# Patient Record
Sex: FEMALE | Race: WHITE | NOT HISPANIC OR LATINO | Employment: STUDENT | URBAN - METROPOLITAN AREA
[De-identification: names, ages, dates, MRNs, and addresses within clinical notes are randomized per-mention and may not be internally consistent; named-entity substitution may affect disease eponyms.]

---

## 2018-01-27 ENCOUNTER — OFFICE VISIT (OUTPATIENT)
Dept: URGENT CARE | Facility: URGENT CARE | Age: 23
End: 2018-01-27
Payer: COMMERCIAL

## 2018-01-27 VITALS
OXYGEN SATURATION: 100 % | HEIGHT: 68 IN | DIASTOLIC BLOOD PRESSURE: 68 MMHG | BODY MASS INDEX: 26.52 KG/M2 | HEART RATE: 63 BPM | WEIGHT: 175 LBS | TEMPERATURE: 98.8 F | SYSTOLIC BLOOD PRESSURE: 125 MMHG

## 2018-01-27 DIAGNOSIS — M54.50 ACUTE BILATERAL LOW BACK PAIN WITHOUT SCIATICA: Primary | ICD-10-CM

## 2018-01-27 PROCEDURE — 99203 OFFICE O/P NEW LOW 30 MIN: CPT | Mod: 25 | Performed by: FAMILY MEDICINE

## 2018-01-27 PROCEDURE — 96372 THER/PROPH/DIAG INJ SC/IM: CPT | Performed by: FAMILY MEDICINE

## 2018-01-27 RX ORDER — KETOROLAC TROMETHAMINE 30 MG/ML
60 INJECTION, SOLUTION INTRAMUSCULAR; INTRAVENOUS ONCE
Qty: 2 ML | Refills: 0 | OUTPATIENT
Start: 2018-01-27 | End: 2018-01-27

## 2018-01-27 RX ORDER — HYDROCODONE BITARTRATE AND ACETAMINOPHEN 5; 325 MG/1; MG/1
1-2 TABLET ORAL EVERY 8 HOURS PRN
Qty: 18 TABLET | Refills: 0 | Status: SHIPPED | OUTPATIENT
Start: 2018-01-27 | End: 2023-05-18

## 2018-01-27 RX ORDER — CYCLOBENZAPRINE HCL 10 MG
5-10 TABLET ORAL 3 TIMES DAILY PRN
Qty: 30 TABLET | Refills: 0 | Status: SHIPPED | OUTPATIENT
Start: 2018-01-27 | End: 2023-05-18

## 2018-01-27 NOTE — MR AVS SNAPSHOT
After Visit Summary   1/27/2018    Dinorah Hart    MRN: [id number]           Patient Information     Date Of Birth          1995        Visit Information        Provider Department      1/27/2018 6:30 PM Lesa Howard MD [facility]        Today's Diagnoses     Acute bilateral low back pain without sciatica    -  1      Care Instructions      Back Pain (Acute or Chronic)    Back pain is one of the most common problems. The good news is that most people feel better in 1 to 2 weeks, and most of the rest in 1 to 2 months. Most people can remain active.  People experience and describe pain differently; not everyone is the same.    The pain can be sharp, stabbing, shooting, aching, cramping or burning.    Movement, standing, bending, lifting, sitting, or walking may worsen pain.    It can be localized to one spot or area, or it can be more generalized.    It can spread or radiate upwards, to the front, or go down your arms or legs (sciatica).    It can cause muscle spasm.  Most of the time, mechanical problems with the muscles or spine cause the pain. Mechanical problems are usually caused by an injury to the muscles or ligaments. While illness can cause back pain, it is usually not caused by a serious illness. Mechanical problems include:     Physical activity such as sports, exercise, work, or normal activity    Overexertion, lifting, pushing, pulling incorrectly or too aggressively    Sudden twisting, bending, or stretching from an accident, or accidental movement    Poor posture    Stretching or moving wrong, without noticing pain at the time    Poor coordination, lack of regular exercise (check with your doctor about this)    Spinal disc disease or arthritis    Stress  Pain can also be related to pregnancy, or illness like appendicitis, bladder or kidney infections, pelvic infections, and many other things.  Acute back pain usually gets better in 1 to 2  weeks. Back pain related to disk disease, arthritis in the spinal joints or spinal stenosis (narrowing of the spinal canal) can become chronic and last for months or years.  Unless you had a physical injury (for example, a car accident or fall) X-rays are usually not needed for the initial evaluation of back pain. If pain continues and does not respond to medical treatment, X-rays and other tests may be needed.  Home care  Try these home care recommendations:    When in bed, try to find a position of comfort. A firm mattress is best. Try lying flat on your back with pillows under your knees. You can also try lying on your side with your knees bent up towards your chest and a pillow between your knees.    At first, do not try to stretch out the sore spots. If there is a strain, it is not like the good soreness you get after exercising without an injury. In this case, stretching may make it worse.    Avoid prolong sitting, long car rides, or travel. This puts more stress on the lower back than standing or walking.    During the first 24 to 72 hours after an acute injury or flare up of chronic back pain, apply an ice pack to the painful area for 20 minutes and then remove it for 20 minutes. Do this over a period of 60 to 90 minutes or several times a day. This will reduce swelling and pain. Wrap the ice pack in a thin towel or plastic to protect your skin.    You can start with ice, then switch to heat. Heat (hot shower, hot bath, or heating pad) reduces pain and works well for muscle spasms. Heat can be applied to the painful area for 20 minutes then remove it for 20 minutes. Do this over a period of 60 to 90 minutes or several times a day. Do not sleep on a heating pad. It can lead to skin burns or tissue damage.    You can alternate ice and heat therapy. Talk with your doctor about the best treatment for your back pain.    Therapeutic massage can help relax the back muscles without stretching them.    Be aware of  safe lifting methods and do not lift anything without stretching first.  Medicines  Talk to your doctor before using medicine, especially if you have other medical problems or are taking other medicines.    You may use over-the-counter medicine as directed on the bottle to control pain, unless another pain medicine was prescribed. If you have chronic conditions like diabetes, liver or kidney disease, stomach ulcers, or gastrointestinal bleeding, or are taking blood thinners, talk to your doctor before taking any medicine.    Be careful if you are given a prescription medicines, narcotics, or medicine for muscle spasms. They can cause drowsiness, affect your coordination, reflexes, and judgement. Do not drive or operate heavy machinery.  Follow-up care  Follow up with your healthcare provider, or as advised.   A radiologist will review any X-rays that were taken. Your provide will notify you of any new findings that may affect your care.  Call 911  Call emergency services if any of the following occur:    Trouble breathing    Confusion    Very drowsy or trouble awakening    Fainting or loss of consciousness    Rapid or very slow heart rate    Loss of bowel or bladder control  When to seek medical advice  Call your healthcare provider right away if any of these occur:     Pain becomes worse or spreads to your legs    Weakness or numbness in one or both legs    Numbness in the groin or genital area  Date Last Reviewed: 7/1/2016 2000-2017 [employer]. [address] [place] [address], [place] [address]. All rights reserved. This information is not intended as a substitute for professional medical care. Always follow your healthcare professional's instructions.                Follow-ups after your visit        Follow-up notes from your care team     Return if symptoms worsen or fail to improve.      Who to contact     If you have questions or need follow up information about today's clinic visit or your schedule  "please contact [facility] directly at [phone].  Normal or non-critical lab and imaging results will be communicated to you by MyChart, letter or phone within 4 business days after the clinic has received the results. If you do not hear from us within 7 days, please contact the clinic through MyChart or phone. If you have a critical or abnormal lab result, we will notify you by phone as soon as possible.  Submit refill requests through [employer] or call your pharmacy and they will forward the refill request to us. Please allow 3 business days for your refill to be completed.          Additional Information About Your Visit        [employer] Information     [employer] lets you send messages to your doctor, view your test results, renew your prescriptions, schedule appointments and more. To sign up, go to [email or web address] . Click on \"Log in\" on the left side of the screen, which will take you to the Welcome page. Then click on \"Sign up Now\" on the right side of the page.     You will be asked to enter the access code listed below, as well as some personal information. Please follow the directions to create your username and password.     Your access code is: [email or web address]  Expires: 2018  7:11 PM     Your access code will  in 90 days. If you need help or a new code, please call your [facility] clinic or [phone].        Care EveryWhere ID     This is your Care EveryWhere ID. This could be used by other organizations to access your [facility] medical records  [id number]        Your Vitals Were     Pulse Temperature Height Last Period Pulse Oximetry Breastfeeding?    63 98.8  F (37.1  C) (Tympanic) 5' 8\" (1.727 m) 2018 100% No    BMI (Body Mass Index)                   26.61 kg/m2            Blood Pressure from Last 3 Encounters:   18 125/68    Weight from Last 3 Encounters:   18 175 lb (79.4 kg)              We Performed the Following     INJECTION INTRAMUSCULAR " OR SUB-Q     KETOROLAC TROMETHAMINE 15MG          Today's Medication Changes          These changes are accurate as of 1/27/18  7:12 PM.  If you have any questions, ask your nurse or doctor.               Start taking these medicines.        Dose/Directions    cyclobenzaprine 10 MG tablet   Commonly known as:  FLEXERIL   Used for:  Acute bilateral low back pain without sciatica   Started by:  Lesa Howard MD        Dose:  5-10 mg   Take 0.5-1 tablets (5-10 mg) by mouth 3 times daily as needed for muscle spasms   Quantity:  30 tablet   Refills:  0       HYDROcodone-acetaminophen 5-325 MG per tablet   Commonly known as:  NORCO   Used for:  Acute bilateral low back pain without sciatica   Started by:  Lesa Howard MD        Dose:  1-2 tablet   Take 1-2 tablets by mouth every 8 hours as needed for moderate to severe pain maximum 6 tablet(s) per day   Quantity:  18 tablet   Refills:  0       ketorolac 60 MG/2ML Soln injection   Commonly known as:  TORADOL   Used for:  Acute bilateral low back pain without sciatica   Started by:  Lesa Howard MD        Dose:  60 mg   Inject 2 mLs (60 mg) into the muscle once for 1 dose   Quantity:  2 mL   Refills:  0            Where to get your medicines      Some of these will need a paper prescription and others can be bought over the counter.  Ask your nurse if you have questions.     Bring a paper prescription for each of these medications     cyclobenzaprine 10 MG tablet    HYDROcodone-acetaminophen 5-325 MG per tablet       You don't need a prescription for these medications     ketorolac 60 MG/2ML Soln injection                Primary Care Provider    Provider Outside       No address on file        Equal Access to Services     [facility]: Hadii kalyan Solitario, warosemaryda luqrobert, qaybta kaолег thomas. [facility] [phone].    [employer]EN[employer]ÓN: meche Soni  disposición servicios gratuitos de asistencia lingüística. Krissy carr [phone].    We comply with applicable federal civil rights laws and [place] laws. We do not discriminate on the basis of race, color, national origin, age, disability, sex, sexual orientation, or gender identity.            Thank you!     Thank you for choosing [facility]  for your care. Our goal is always to provide you with excellent care. Hearing back from our patients is one way we can continue to improve our services. Please take a few minutes to complete the written survey that you may receive in the mail after your visit with us. Thank you!             Your Updated Medication List - Protect others around you: Learn how to safely use, store and throw away your medicines at www.disposemymeds.org.          This list is accurate as of 1/27/18  7:12 PM.  Always use your most recent med list.                   Brand Name Dispense Instructions for use Diagnosis    cyclobenzaprine 10 MG tablet    FLEXERIL    30 tablet    Take 0.5-1 tablets (5-10 mg) by mouth 3 times daily as needed for muscle spasms    Acute bilateral low back pain without sciatica       HYDROcodone-acetaminophen 5-325 MG per tablet    NORCO    18 tablet    Take 1-2 tablets by mouth every 8 hours as needed for moderate to severe pain maximum 6 tablet(s) per day    Acute bilateral low back pain without sciatica       ketorolac 60 MG/2ML Soln injection    TORADOL    2 mL    Inject 2 mLs (60 mg) into the muscle once for 1 dose    Acute bilateral low back pain without sciatica

## 2018-01-28 NOTE — PATIENT INSTRUCTIONS

## 2018-01-28 NOTE — PROGRESS NOTES
"SUBJECTIVE:   Dinorah Hart is a 22 year old female who complains of an injury causing low back pain 1 day ago. The pain is positional with bending or lifting, with radiation down the left leg today while driving home for 2 hours. Mechanism of injury: lifted heavy sled with ice augers with her dad last night.  About 3 hours later, she stood up and noticed extreme low back pain. Symptoms have been worsening since that time. Prior history of back problems: no prior back problems. There was numbness in the left foot while driving home as well but not now.      OBJECTIVE: /68 (BP Location: Left arm, Patient Position: Semi-Knight's, Cuff Size: Adult Regular)  Pulse 63  Temp 98.8  F (37.1  C) (Tympanic)  Ht 5' 8\" (1.727 m)  Wt 175 lb (79.4 kg)  LMP 01/13/2018  SpO2 100%  Breastfeeding? No  BMI 26.61 kg/m2   Patient appears to be in moderate pain, antalgic gait noted. Lumbosacral spine area reveals no local tenderness or mass. There is bilateral L-S paraspinal muscle tenderness and spasm.  Painful and reduced LS ROM noted. Straight leg raise is negative bilaterally. DTR' including heel and toe gait is normal.      Lumbar spine X-Ray: not indicated.     ASSESSMENT:   Acute low back pain without sciatica    PLAN:  Toradol given as per orders.   For acute pain, rest, intermittent application of cold packs (later, may switch to heat, but do not sleep on heating pad), analgesics and muscle relaxants are recommended. Discussed longer term treatment plan of prn NSAID's and discussed a home back care exercise program with flexion exercise routine. Vicodin as per orders. Proper lifting with avoidance of heavy lifting discussed. Consider Physical Therapy and XRay studies if not improving. Call or return to clinic prn if these symptoms worsen or fail to improve as anticipated.  Lesa Allen MD    "

## 2018-01-28 NOTE — NURSING NOTE
"Chief Complaint   Patient presents with     Urgent Care     Back Pain     pt stated that it started last night and level of pain was 9/10       Initial /68 (BP Location: Left arm, Patient Position: Semi-Knight's, Cuff Size: Adult Regular)  Pulse 63  Temp 98.8  F (37.1  C) (Tympanic)  Ht 5' 8\" (1.727 m)  Wt 175 lb (79.4 kg)  LMP 01/13/2018  SpO2 100%  Breastfeeding? No  BMI 26.61 kg/m2 Estimated body mass index is 26.61 kg/(m^2) as calculated from the following:    Height as of this encounter: 5' 8\" (1.727 m).    Weight as of this encounter: 175 lb (79.4 kg).  Medication Reconciliation: complete   SMA Adrián    "

## 2023-05-06 ENCOUNTER — HEALTH MAINTENANCE LETTER (OUTPATIENT)
Age: 28
End: 2023-05-06

## 2023-05-18 ENCOUNTER — OFFICE VISIT (OUTPATIENT)
Dept: FAMILY MEDICINE | Facility: CLINIC | Age: 28
End: 2023-05-18
Payer: COMMERCIAL

## 2023-05-18 VITALS
WEIGHT: 204.9 LBS | HEART RATE: 64 BPM | BODY MASS INDEX: 32.16 KG/M2 | TEMPERATURE: 98.2 F | SYSTOLIC BLOOD PRESSURE: 118 MMHG | HEIGHT: 67 IN | DIASTOLIC BLOOD PRESSURE: 86 MMHG | OXYGEN SATURATION: 98 %

## 2023-05-18 DIAGNOSIS — Z00.00 ROUTINE HISTORY AND PHYSICAL EXAMINATION OF ADULT: Primary | ICD-10-CM

## 2023-05-18 DIAGNOSIS — Z12.4 SCREENING FOR CERVICAL CANCER: ICD-10-CM

## 2023-05-18 DIAGNOSIS — Z11.3 ROUTINE SCREENING FOR STI (SEXUALLY TRANSMITTED INFECTION): ICD-10-CM

## 2023-05-18 DIAGNOSIS — Z30.432 ENCOUNTER FOR IUD REMOVAL: ICD-10-CM

## 2023-05-18 LAB — T VAGINALIS DNA SPEC QL NAA+PROBE: NOT DETECTED

## 2023-05-18 PROCEDURE — 87661 TRICHOMONAS VAGINALIS AMPLIF: CPT | Mod: ORL

## 2023-05-18 PROCEDURE — 87591 N.GONORRHOEAE DNA AMP PROB: CPT | Mod: ORL

## 2023-05-18 PROCEDURE — 87491 CHLMYD TRACH DNA AMP PROBE: CPT | Mod: ORL

## 2023-05-18 PROCEDURE — G0145 SCR C/V CYTO,THINLAYER,RESCR: HCPCS | Mod: ORL

## 2023-05-18 PROCEDURE — 80061 LIPID PANEL: CPT | Mod: ORL

## 2023-05-18 ASSESSMENT — ENCOUNTER SYMPTOMS
PSYCHIATRIC NEGATIVE: 1
GASTROINTESTINAL NEGATIVE: 1
MUSCULOSKELETAL NEGATIVE: 1
EYES NEGATIVE: 1
ALLERGIC/IMMUNOLOGIC NEGATIVE: 1
NEUROLOGICAL NEGATIVE: 1
CARDIOVASCULAR NEGATIVE: 1
RESPIRATORY NEGATIVE: 1
HEMATOLOGIC/LYMPHATIC NEGATIVE: 1
ENDOCRINE NEGATIVE: 1
CONSTITUTIONAL NEGATIVE: 1

## 2023-05-18 ASSESSMENT — PATIENT HEALTH QUESTIONNAIRE - PHQ9
SUM OF ALL RESPONSES TO PHQ QUESTIONS 1-9: 3
5. POOR APPETITE OR OVEREATING: NOT AT ALL

## 2023-05-18 ASSESSMENT — ANXIETY QUESTIONNAIRES
GAD7 TOTAL SCORE: 0
5. BEING SO RESTLESS THAT IT IS HARD TO SIT STILL: NOT AT ALL
2. NOT BEING ABLE TO STOP OR CONTROL WORRYING: NOT AT ALL
IF YOU CHECKED OFF ANY PROBLEMS ON THIS QUESTIONNAIRE, HOW DIFFICULT HAVE THESE PROBLEMS MADE IT FOR YOU TO DO YOUR WORK, TAKE CARE OF THINGS AT HOME, OR GET ALONG WITH OTHER PEOPLE: NOT DIFFICULT AT ALL
GAD7 TOTAL SCORE: 0
1. FEELING NERVOUS, ANXIOUS, OR ON EDGE: NOT AT ALL
3. WORRYING TOO MUCH ABOUT DIFFERENT THINGS: NOT AT ALL
6. BECOMING EASILY ANNOYED OR IRRITABLE: NOT AT ALL
7. FEELING AFRAID AS IF SOMETHING AWFUL MIGHT HAPPEN: NOT AT ALL

## 2023-05-18 NOTE — NURSING NOTE
"ROOM:1  MELIDA MARTINO    Preferred Name: Dinorah     How did you hear about us?  Other - Referred by friend    28 year old  Chief Complaint   Patient presents with     Physical     With pap       Blood pressure 118/86, pulse 64, temperature 98.2  F (36.8  C), temperature source Oral, height 1.712 m (5' 7.4\"), weight 92.9 kg (204 lb 14.4 oz), SpO2 98 %, not currently breastfeeding. Body mass index is 31.71 kg/m .  BP completed using cuff size:        There is no problem list on file for this patient.      Wt Readings from Last 2 Encounters:   05/18/23 92.9 kg (204 lb 14.4 oz)   01/27/18 79.4 kg (175 lb)     BP Readings from Last 3 Encounters:   05/18/23 118/86   01/27/18 125/68       Allergies   Allergen Reactions     Penapar-Vk [Penicillin V]        Current Outpatient Medications   Medication     cyclobenzaprine (FLEXERIL) 10 MG tablet     HYDROcodone-acetaminophen (NORCO) 5-325 MG per tablet     No current facility-administered medications for this visit.       Social History     Tobacco Use     Smoking status: Never     Smokeless tobacco: Never   Vaping Use     Vaping status: Never Used   Substance Use Topics     Alcohol use: Yes     Drug use: Never       Honoring Choices - Health Care Directive Guide offered to patient at time of visit.    Health Maintenance Due   Topic Date Due     YEARLY PREVENTIVE VISIT  Never done     ADVANCE CARE PLANNING  Never done     HIV SCREENING  Never done     HEPATITIS C SCREENING  Never done     PAP  Never done     DTAP/TDAP/TD IMMUNIZATION (7 - Td or Tdap) 05/07/2019       Immunization History   Administered Date(s) Administered     COVID-19 Bivalent 18+ (Moderna) 12/11/2022     COVID-19 Monovalent 18+ (Moderna) 01/29/2021, 02/26/2021       No results found for: PAP    No lab results found.         View : No data to display.                    5/18/2023     7:44 AM   PHQ-9 SCORE   PHQ-9 Total Score 3           5/18/2023     7:44 AM   ARTURO-7 SCORE   Total Score 0            View : No " data to display.                Tess Kirk    May 18, 2023 7:49 AM

## 2023-05-18 NOTE — PROGRESS NOTES
ANNUAL WELLNESS EXAM     Today's Date: May 18, 2023     Patient Dinorah Hart 1995 presents to the clinic today for a preventative health visit. She is otherwise healthy with no significant PMH and on no chronic medications. She works as a  (grades K-5).         SUBJECTIVE     History of Present Illness:  Currently has kyleena IUD. Inserted May 2018. Considering another IUD vs nexplanon.      Allergies   Allergen Reactions     Penapar-Vk [Penicillin V]       No current outpatient medications    Past Medical History:   Diagnosis Date     No known health problems         Family History   Problem Relation Age of Onset     Diabetes Maternal Grandmother      Heart Failure Maternal Grandfather      Mental Illness Paternal Grandfather      Heart Failure Paternal Grandfather         Do you have a first-degree relative with a history of the following:  A. Cancer of the breast or ovaries - No   B. Heart attack, heart pain, or stroke before the age of 55 - No  C. Unexplained death from drowning or car accident - No  D. Osteoporosis or any other significant bone health concerns - No    Social History     Tobacco Use     Smoking status: Never     Smokeless tobacco: Never   Vaping Use     Vaping status: Never Used   Substance Use Topics     Alcohol use: Yes     Drug use: Never        History   Sexual Activity     Sexual activity: Not Currently     Birth control/ protection: I.U.D.             5/18/2023     7:44 AM   PHQ   PHQ-9 Total Score 3   Q9: Thoughts of better off dead/self-harm past 2 weeks Not at all        Immunization History   Administered Date(s) Administered     COVID-19 Bivalent 18+ (Moderna) 12/11/2022     COVID-19 Monovalent 18+ (Moderna) 01/29/2021, 02/26/2021        Health Maintenance Due   Topic Date Due     YEARLY PREVENTIVE VISIT  Never done     ADVANCE CARE PLANNING  Never done     HIV SCREENING  Never done     HEPATITIS C SCREENING  Never done     PAP  Never done      "DTAP/TDAP/TD IMMUNIZATION (7 - Td or Tdap) 05/07/2019      Health Maintenance components reviewed - Seasonal Influenza vaccine status is up to date & Covid-19 vaccine status is up to date.    Diet: in general, a \"healthy\" diet      Exercise: Runs multiple times a week    LMP 5/11/2023    Review of Systems   Constitutional: Negative.    HENT: Negative.    Eyes: Negative.    Respiratory: Negative.    Cardiovascular: Negative.    Gastrointestinal: Negative.    Endocrine: Negative.    Breasts:  negative.    Genitourinary: Negative.    Musculoskeletal: Negative.    Allergic/Immunologic: Negative.    Neurological: Negative.    Hematological: Negative.    Psychiatric/Behavioral: Negative.                OBJECTIVE     /86   Pulse 64   Temp 98.2  F (36.8  C) (Oral)   Ht 1.712 m (5' 7.4\")   Wt 92.9 kg (204 lb 14.4 oz)   SpO2 98%   BMI 31.71 kg/m         Labs:  No results found for: WBC, HGB, HCT, PLT, CHOL, TRIG, HDL, LDLDIRECT, ALT, AST, NA, CREATININE, BUN, CO2, TSH, PSA, INR, GLUF, HGBA1C, MICROALBUR    Physical Exam  Vitals reviewed.   Constitutional:       General: She is not in acute distress.     Appearance: Normal appearance. She is well-developed. She is not ill-appearing.   HENT:      Head: Normocephalic and atraumatic.      Right Ear: Tympanic membrane and external ear normal.      Left Ear: Tympanic membrane and external ear normal.      Nose: Nose normal. No rhinorrhea.      Mouth/Throat:      Mouth: Mucous membranes are moist.      Pharynx: No oropharyngeal exudate.   Eyes:      General:         Right eye: No discharge.         Left eye: No discharge.      Extraocular Movements: Extraocular movements intact.      Conjunctiva/sclera: Conjunctivae normal.   Neck:      Thyroid: No thyromegaly.      Trachea: No tracheal deviation.   Cardiovascular:      Rate and Rhythm: Normal rate and regular rhythm.      Pulses: Normal pulses.      Heart sounds: Normal heart sounds, S1 normal and S2 normal. No murmur " heard.     No friction rub. No S3 or S4 sounds.   Pulmonary:      Effort: Pulmonary effort is normal. No respiratory distress.      Breath sounds: Normal breath sounds. No wheezing or rales.   Chest:   Breasts:     Right: No inverted nipple, mass, nipple discharge or skin change.      Left: No inverted nipple, mass, nipple discharge or skin change.   Abdominal:      General: Bowel sounds are normal.      Palpations: Abdomen is soft. There is no mass.   Genitourinary:     Labia:         Right: No rash.         Left: No rash.       Vagina: Normal.      Cervix: Normal.      Comments: Mild spotting from cervical os. IUD strings easily visualized.  Musculoskeletal:         General: Normal range of motion.      Cervical back: Normal range of motion and neck supple.      Right lower leg: No edema.      Left lower leg: No edema.   Lymphadenopathy:      Cervical: No cervical adenopathy.      Upper Body:      Right upper body: No supraclavicular or axillary adenopathy.      Left upper body: No supraclavicular or axillary adenopathy.   Skin:     General: Skin is warm and dry.      Capillary Refill: Capillary refill takes less than 2 seconds.      Findings: No rash.   Neurological:      General: No focal deficit present.      Mental Status: She is alert and oriented to person, place, and time.      Motor: No abnormal muscle tone.      Deep Tendon Reflexes: Reflexes are normal and symmetric.   Psychiatric:         Mood and Affect: Mood normal.         Behavior: Behavior normal.         Thought Content: Thought content normal.         Judgment: Judgment normal.             ASSESSMENT/PLAN     (Z00.00) Routine history and physical examination of adult  (primary encounter diagnosis)  Plan: Lipid panel  -Discussed/Reinforced healthy diety, lifestyle, exercise and safety.  -Recommended completion of routine dental and eye exam.  -Lab screenings completed today. Results pending.    (Z12.4) Screening for cervical cancer  Comment: last  PAP 5 years ago. Normal.  Plan: GYNECOLOGIC CYTOLOGY (PAP SMEAR LAB ORDER)    (Z11.3) Routine screening for STI (sexually transmitted infection)  Plan: Chlamydia trachomatis/Neisseria gonorrhoeae by         PCR - Clinic Collect, Trichomonas vaginalis DNA        PCR    (Z30.948) Encounter for IUD removal  IUD removal discussed with patient. Risks discussed including pregnancy, inability to visualize strings, partial removal. Patient understands, is comfortable with proceeding.    Cervix visualized with speculum, strings visualized from os. Strings grasped with sterile ring forceps. Gentle traction applied with removal of IUD. IUD inspected and is complete and intact. Minimal bleeding noted. Patient tolerated procedure well.     Discussed pelvic rest x 1 week, return of menses and fertility. Patient will return tomorrow for nexplanon insertion.     Follow-Up:  Scheduled for visit 5/19 for nexplanon insertion.    Patient engaged in their plan of care. Patient verbalized understanding and agreed with the final plan.  AVS printed and given to patient.    Lin Armendariz NP   [facility]  [facility]  [address] [place] [id number]  [phone]

## 2023-05-19 ENCOUNTER — OFFICE VISIT (OUTPATIENT)
Dept: FAMILY MEDICINE | Facility: CLINIC | Age: 28
End: 2023-05-19
Payer: COMMERCIAL

## 2023-05-19 VITALS
DIASTOLIC BLOOD PRESSURE: 83 MMHG | OXYGEN SATURATION: 100 % | BODY MASS INDEX: 32.02 KG/M2 | TEMPERATURE: 97.9 F | SYSTOLIC BLOOD PRESSURE: 122 MMHG | HEIGHT: 67 IN | RESPIRATION RATE: 18 BRPM | HEART RATE: 59 BPM | WEIGHT: 204 LBS

## 2023-05-19 DIAGNOSIS — Z30.017 NEXPLANON INSERTION: Primary | ICD-10-CM

## 2023-05-19 LAB
C TRACH DNA SPEC QL PROBE+SIG AMP: NEGATIVE
CHOLEST SERPL-MCNC: 198 MG/DL
HDLC SERPL-MCNC: 49 MG/DL
LDLC SERPL CALC-MCNC: 141 MG/DL
N GONORRHOEA DNA SPEC QL NAA+PROBE: NEGATIVE
NONHDLC SERPL-MCNC: 149 MG/DL
TRIGL SERPL-MCNC: 41 MG/DL

## 2023-05-19 ASSESSMENT — PAIN SCALES - GENERAL: PAINLEVEL: NO PAIN (0)

## 2023-05-19 NOTE — PROGRESS NOTES
"  Isa Copeland is a 28 year old patient here today for nexplanon (insertion). She desires nexplanon placement.     She had a kyleena IUD that was removed yesterday for contraception. She denies unprotected sexual intercourse in the past two weeks or since her last menstrual period. LMP 5/11/2023.  Menstrual cycles are regular.     Risks and benefits of nexplanon long term contraception were discussed including:  - Pregnancy rate of <0.4%  - Irregular bleeding/spotting in ~15% of users, especially in first three months  - Amenorrhea in ~20% of users  - Complications in <1% related to insertion including infection, scar, local nerve irritation, bruising    She understands risks and benefits and is comfortable proceeding. All questions answered. Consent form signed.    General Physical Exam:  Vitals: /83 (BP Location: Left arm, Patient Position: Sitting, Cuff Size: Adult Regular)   Pulse 59   Temp 97.9  F (36.6  C) (Oral)   Resp 18   Ht 1.702 m (5' 7\")   Wt 92.5 kg (204 lb)   SpO2 100%   BMI 31.95 kg/m    General: alert, no acute distress    Left arm was cleansed with alcohol swab after marking insertion site overlying the triceps in upper arm per  guidelines. 3cc of 1% Lidocaine was used to anesthetize insertion site. Arm was cleansed with betadine, appropriate sterile technique used. Nexplanon terry visible in needle cap prior to insertion.  Needle of device inserted at 30 degree angle until skin punctured, then needle was placed horizontal to skin and tented under skin until fully inserted.  Purple slider retracted fully back, then removed.  Implant was palpated by both provider and patient. Skin cleansed. Adhesive bandage placed over insertion site, then pressure dressing was applied.  User card completed and given to patient, chart label to be scanned into chart.  Pt instructed to remove pressure dressing after 24h, leave adhesive bandage in place and keep area clean/dry for three days. " Alternate contraception to be used for 7 days.     Nexplanon placement added to problem list with date of removal three years from 5/19/2023.     (Z30.017) Nexplanon insertion  (primary encounter diagnosis)  Plan: etonogestrel (NEXPLANON) subdermal implant 68         Mg    Lin Armendariz, NP

## 2023-05-19 NOTE — NURSING NOTE
"ROOM:1  MELIDA MARTINO    Preferred Name: Dinorah     How did you hear about us?  Current Patient    28 year old  Chief Complaint   Patient presents with     nexplanon     insertion       Blood pressure 122/83, pulse 59, temperature 97.9  F (36.6  C), temperature source Oral, resp. rate 18, height 1.702 m (5' 7\"), weight 92.5 kg (204 lb), SpO2 100 %, not currently breastfeeding. Body mass index is 31.95 kg/m .  BP completed using cuff size:        There is no problem list on file for this patient.      Wt Readings from Last 2 Encounters:   05/19/23 92.5 kg (204 lb)   05/18/23 92.9 kg (204 lb 14.4 oz)     BP Readings from Last 3 Encounters:   05/19/23 122/83   05/18/23 118/86   01/27/18 125/68       Allergies   Allergen Reactions     Penapar-Vk [Penicillin V]        No current outpatient medications on file.     No current facility-administered medications for this visit.       Social History     Tobacco Use     Smoking status: Never     Smokeless tobacco: Never   Vaping Use     Vaping status: Never Used   Substance Use Topics     Alcohol use: Yes     Drug use: Never       Honoring Choices - Health Care Directive Guide offered to patient at time of visit.    Health Maintenance Due   Topic Date Due     ADVANCE CARE PLANNING  Never done     HIV SCREENING  Never done     HEPATITIS C SCREENING  Never done     PAP  Never done     DTAP/TDAP/TD IMMUNIZATION (7 - Td or Tdap) 05/07/2019       Immunization History   Administered Date(s) Administered     COVID-19 Bivalent 18+ (Moderna) 12/11/2022     COVID-19 Monovalent 18+ (Moderna) 01/29/2021, 02/26/2021       No results found for: PAP    Recent Labs   Lab Test 05/18/23  0849   *   HDL 49*   TRIG 41           5/19/2023     7:56 AM   PHQ-2 ( 1999 Pfizer)   Q1: Little interest or pleasure in doing things 0   Q2: Feeling down, depressed or hopeless 0   PHQ-2 Score 0           5/18/2023     7:44 AM   PHQ-9 SCORE   PHQ-9 Total Score 3           5/18/2023     7:44 AM   ARTURO-7 " SCORE   Total Score 0            View : No data to display.                Lenora Mandujano, EMT    May 19, 2023 7:58 AM

## 2023-05-23 LAB
BKR LAB AP GYN ADEQUACY: NORMAL
BKR LAB AP GYN INTERPRETATION: NORMAL
BKR LAB AP HPV REFLEX: NORMAL
BKR LAB AP LMP: NORMAL
BKR LAB AP PREVIOUS ABNORMAL: NORMAL
PATH REPORT.COMMENTS IMP SPEC: NORMAL
PATH REPORT.COMMENTS IMP SPEC: NORMAL
PATH REPORT.RELEVANT HX SPEC: NORMAL

## 2024-02-26 NOTE — PROGRESS NOTES
"SUBJECTIVE:    Dinorah is a 27 y/o female who presents today for evaluation of abnormal uterine bleeding.  Nexplanon was inserted 5/2023; prior to this she had a Kyleena IUD. Periods lengthened from 4-5 days with the IUD to 5-8 days with Nexplanon; they continued to be monthly until December. From December until present has had constant mod-heavy spotting to a \"full blown period\". Cramping off and on. Going through 3-4 tampons/day. Denies pain or any abnormal discharge. Not currently sexually active, last partner was in July and has had negative STI testing since that time. Denies known history of fibroids or polyps. Not on anticoagulation. Denies weight changes, high stress, changes in diet/ exercise, hirsutism, significant acne changes, temperature dysregulation and nipple discharge. Does endorse feelings of fatigue.    Last pap smear: 5/2023 NIL     Allergies   Allergen Reactions    Penapar-Vk [Penicillin V]      No current outpatient medications on file.    PAST MEDICAL HISTORY:   Past Medical History:   Diagnosis Date    No known health problems      PAST SURGICAL HISTORY: No past surgical history on file.    FAMILY HISTORY:   Family History   Problem Relation Age of Onset    Diabetes Maternal Grandmother     Heart Failure Maternal Grandfather     Mental Illness Paternal Grandfather     Heart Failure Paternal Grandfather      SOCIAL HISTORY:   Social History     Tobacco Use    Smoking status: Never    Smokeless tobacco: Never   Substance Use Topics    Alcohol use: Yes     OBJECTIVE:  /86   Pulse 72   Ht 1.702 m (5' 7\")   Wt 100.2 kg (220 lb 14.4 oz)   BMI 34.60 kg/m    Pelvic Exam:  Vulva: No external lesions, normal hair distribution, no adenopathy  Vagina: Moist, pink, scant blood noted, well rugated, no lesions  Cervix: Smooth, pink, no visible lesions. Negative for CMT.  Uterus: Normal size, anteverted, non-tender, mobile  Ovaries: No mass, non-tender, mobile    ASSESSMENT/PLAN:    1. Excessive " bleeding in premenopausal period  2. Encounter for surveillance of Nexplanon subdermal contraceptive  Dinorah is a 28 yr old female, , who has a nexplanon in place; she experienced a change in her menstrual pattern 7 months after placement, now having 2 months of prolonged bleeding. Physical exam not significant for cervical structural reasons for AUB. TVUS ordered to rule out uterine structural causes. Has had neg STD testing; not sexually active. CBC ordered for prolonged heavy bleeding and fatigue. Will rule out thyroid dysfunction. Discussed option of adding a KAMRYN for bleeding control, declined at this time but is interested in having Nexplanon removed and IUD inserted if no significant findings in work-up. Past hstory of significant pain with IUD insertions.  Will follow up after to discuss findings/removal of nexplanon after US.   - TSH with free T4 reflex; Future  - CBC with Platelets; Future  - US Transvaginal Non OB; Future    I, Ashley Johnson, completed the PFSH and ROS. I then acted as a scribe for THOR Gupta, for the remainder of the visit.  Ashley Johnson BSN, RN, NORMANP Student    I agree with the PFSH and ROS as completed by the THOR Student, except for changes made by me.  The remainder of the encounter was performed by me and scribed by the WHNP Student.  The scribed note accurately reflects my personal services and decisions made by me.  Teresa Hunt, PEGGY, APRN, THOR

## 2024-02-27 ENCOUNTER — OFFICE VISIT (OUTPATIENT)
Dept: OBGYN | Facility: CLINIC | Age: 29
End: 2024-02-27
Attending: NURSE PRACTITIONER
Payer: COMMERCIAL

## 2024-02-27 VITALS
WEIGHT: 220.9 LBS | HEIGHT: 67 IN | DIASTOLIC BLOOD PRESSURE: 86 MMHG | SYSTOLIC BLOOD PRESSURE: 137 MMHG | BODY MASS INDEX: 34.67 KG/M2 | HEART RATE: 72 BPM

## 2024-02-27 DIAGNOSIS — N92.4 EXCESSIVE BLEEDING IN PREMENOPAUSAL PERIOD: ICD-10-CM

## 2024-02-27 DIAGNOSIS — Z30.46 ENCOUNTER FOR SURVEILLANCE OF NEXPLANON SUBDERMAL CONTRACEPTIVE: Primary | ICD-10-CM

## 2024-02-27 PROCEDURE — 99204 OFFICE O/P NEW MOD 45 MIN: CPT | Performed by: NURSE PRACTITIONER

## 2024-02-27 PROCEDURE — 99213 OFFICE O/P EST LOW 20 MIN: CPT | Performed by: NURSE PRACTITIONER

## 2024-02-27 NOTE — PATIENT INSTRUCTIONS
Thank you for trusting us with your care!     If you need to contact us for questions about:  Symptoms, Scheduling & Medical Questions; Non-urgent (2-3 day response) Avery message, Urgent (needing response today) [phone] (if after 3:30pm next day response)   Prescriptions: Please call your Pharmacy   Billing: [facility] [phone] or [facility]:[phone]

## 2024-02-27 NOTE — LETTER
"2/27/2024       RE: Dinorah Hart  [address]  [place] [address]     Dear Colleague,    Thank you for referring your patient, Dinorah Hart, to the [facility] [place] at [facility]. Please see a copy of my visit note below.    SUBJECTIVE:    Dinorah is a 27 y/o female who presents today for evaluation of abnormal uterine bleeding.  Nexplanon was inserted 5/2023; prior to this she had a Kyleena IUD. Periods lengthened from 4-5 days with the IUD to 5-8 days with Nexplanon; they continued to be monthly until December. From December until present has had constant mod-heavy spotting to a \"full blown period\". Cramping off and on. Going through 3-4 tampons/day. Denies pain or any abnormal discharge. Not currently sexually active, last partner was in July and has had negative STI testing since that time. Denies known history of fibroids or polyps. Not on anticoagulation. Denies weight changes, high stress, changes in diet/ exercise, hirsutism, significant acne changes, temperature dysregulation and nipple discharge. Does endorse feelings of fatigue.    Last pap smear: 5/2023 NIL     Allergies   Allergen Reactions    Penapar-Vk [Penicillin V]      No current outpatient medications on file.    PAST MEDICAL HISTORY:   Past Medical History:   Diagnosis Date    No known health problems      PAST SURGICAL HISTORY: No past surgical history on file.    FAMILY HISTORY:   Family History   Problem Relation Age of Onset    Diabetes Maternal Grandmother     Heart Failure Maternal Grandfather     Mental Illness Paternal Grandfather     Heart Failure Paternal Grandfather      SOCIAL HISTORY:   Social History     Tobacco Use    Smoking status: Never    Smokeless tobacco: Never   Substance Use Topics    Alcohol use: Yes     OBJECTIVE:  /86   Pulse 72   Ht 1.702 m (5' 7\")   Wt 100.2 kg (220 lb 14.4 oz)   BMI 34.60 kg/m    Pelvic Exam:  Vulva: No " external lesions, normal hair distribution, no adenopathy  Vagina: Moist, pink, scant blood noted, well rugated, no lesions  Cervix: Smooth, pink, no visible lesions. Negative for CMT.  Uterus: Normal size, anteverted, non-tender, mobile  Ovaries: No mass, non-tender, mobile    ASSESSMENT/PLAN:    1. Excessive bleeding in premenopausal period  2. Encounter for surveillance of Nexplanon subdermal contraceptive  Dinorah is a 28 yr old female, , who has a nexplanon in place; she experienced a change in her menstrual pattern 7 months after placement, now having 2 months of prolonged bleeding. Physical exam not significant for cervical structural reasons for AUB. TVUS ordered to rule out uterine structural causes. Has had neg STD testing; not sexually active. CBC ordered for prolonged heavy bleeding and fatigue. Will rule out thyroid dysfunction. Discussed option of adding a KAMRYN for bleeding control, declined at this time but is interested in having Nexplanon removed and IUD inserted if no significant findings in work-up. Past hstory of significant pain with IUD insertions.  Will follow up after to discuss findings/removal of nexplanon after US.   - TSH with free T4 reflex; Future  - CBC with Platelets; Future  - US Transvaginal Non OB; Future    I, Ashley Johnson, completed the PFSH and ROS. I then acted as a scribe for THOR Gupta, for the remainder of the visit.  Ashley Johnson BSN, RN, NORMANP Student    I agree with the PFSH and ROS as completed by the THOR Student, except for changes made by me.  The remainder of the encounter was performed by me and scribed by the WHNP Student.  The scribed note accurately reflects my personal services and decisions made by me.  Teresa Hunt, DNP, APRN, THOR

## 2024-03-21 ENCOUNTER — ANCILLARY PROCEDURE (OUTPATIENT)
Dept: ULTRASOUND IMAGING | Facility: CLINIC | Age: 29
End: 2024-03-21
Attending: NURSE PRACTITIONER
Payer: COMMERCIAL

## 2024-03-21 ENCOUNTER — OFFICE VISIT (OUTPATIENT)
Dept: OBGYN | Facility: CLINIC | Age: 29
End: 2024-03-21
Attending: NURSE PRACTITIONER
Payer: COMMERCIAL

## 2024-03-21 VITALS
DIASTOLIC BLOOD PRESSURE: 82 MMHG | SYSTOLIC BLOOD PRESSURE: 129 MMHG | HEART RATE: 73 BPM | WEIGHT: 217.5 LBS | BODY MASS INDEX: 34.14 KG/M2 | HEIGHT: 67 IN

## 2024-03-21 DIAGNOSIS — Z30.46 ENCOUNTER FOR NEXPLANON REMOVAL: ICD-10-CM

## 2024-03-21 DIAGNOSIS — Z30.46 ENCOUNTER FOR SURVEILLANCE OF NEXPLANON SUBDERMAL CONTRACEPTIVE: ICD-10-CM

## 2024-03-21 DIAGNOSIS — Z30.430 ENCOUNTER FOR IUD INSERTION: ICD-10-CM

## 2024-03-21 DIAGNOSIS — N92.1 PROLONGED MENSTRUATION: Primary | ICD-10-CM

## 2024-03-21 DIAGNOSIS — N92.4 EXCESSIVE BLEEDING IN PREMENOPAUSAL PERIOD: ICD-10-CM

## 2024-03-21 DIAGNOSIS — Z30.430 ENCOUNTER FOR INSERTION OF INTRAUTERINE CONTRACEPTIVE DEVICE: ICD-10-CM

## 2024-03-21 PROCEDURE — 250N000013 HC RX MED GY IP 250 OP 250 PS 637: Performed by: NURSE PRACTITIONER

## 2024-03-21 PROCEDURE — 99213 OFFICE O/P EST LOW 20 MIN: CPT | Mod: 25 | Performed by: NURSE PRACTITIONER

## 2024-03-21 PROCEDURE — 250N000011 HC RX IP 250 OP 636: Performed by: NURSE PRACTITIONER

## 2024-03-21 PROCEDURE — 99214 OFFICE O/P EST MOD 30 MIN: CPT | Mod: 25 | Performed by: NURSE PRACTITIONER

## 2024-03-21 PROCEDURE — 76830 TRANSVAGINAL US NON-OB: CPT

## 2024-03-21 PROCEDURE — 76830 TRANSVAGINAL US NON-OB: CPT | Mod: 26 | Performed by: OBSTETRICS & GYNECOLOGY

## 2024-03-21 PROCEDURE — 58300 INSERT INTRAUTERINE DEVICE: CPT | Performed by: NURSE PRACTITIONER

## 2024-03-21 PROCEDURE — 11982 REMOVE DRUG IMPLANT DEVICE: CPT | Performed by: NURSE PRACTITIONER

## 2024-03-21 RX ORDER — OXYCODONE HYDROCHLORIDE 5 MG/1
5 TABLET ORAL EVERY 6 HOURS PRN
Qty: 4 TABLET | Refills: 0 | Status: SHIPPED | OUTPATIENT
Start: 2024-03-21 | End: 2024-03-23

## 2024-03-21 RX ORDER — IBUPROFEN 200 MG
800 TABLET ORAL ONCE
Status: COMPLETED | OUTPATIENT
Start: 2024-03-21 | End: 2024-03-21

## 2024-03-21 RX ADMIN — IBUPROFEN 800 MG: 200 TABLET, FILM COATED ORAL at 12:39

## 2024-03-21 RX ADMIN — LEVONORGESTREL 1 EACH: 19.5 INTRAUTERINE DEVICE INTRAUTERINE at 12:41

## 2024-03-21 NOTE — PATIENT INSTRUCTIONS
Thank you for trusting us with your care!     If you need to contact us for questions about:  Symptoms, Scheduling & Medical Questions; Non-urgent (2-3 day response) Avery message, Urgent (needing response today) [phone] (if after 3:30pm next day response)   Prescriptions: Please call your Pharmacy   Billing: [facility] [phone] or [facility]:[phone]

## 2024-03-21 NOTE — PROGRESS NOTES
"Subjective:  Dinorah is a 28 yr old female, presenting today for ultrasound follow up. US was completed this morning due to prolonged menstrual bleeding with Nexplanon in place to rule out structural abnormalities. Other etiologies have been evaluated for (see note from 2/27/24).  Her bleeding started at the end of December 2023 and was a continuous period-like bleeding till the beginning of March 2024; now the bleeding has slowly tapered to spotting.     Past Medical History:   Diagnosis Date    No known health problems      No past surgical history on file.    Family History   Problem Relation Age of Onset    Diabetes Maternal Grandmother     Heart Failure Maternal Grandfather     Mental Illness Paternal Grandfather     Heart Failure Paternal Grandfather         Current Outpatient Medications   Medication    etonogestrel (NEXPLANON) 68 MG IMPL    levonorgestrel (KYLEENA) 19.5 MG IUD    oxyCODONE (ROXICODONE) 5 MG tablet     No current facility-administered medications for this visit.     Allergies   Allergen Reactions    Penapar-Vk [Penicillin V]      Objective:  /82   Pulse 73   Ht 1.702 m (5' 7\")   Wt 98.7 kg (217 lb 8 oz)   BMI 34.07 kg/m     General: pleasant female in no acute distress  Psych: normal mentation, well oriented  Respiratory:  Unlabored breathing  Musculoskeletal: no gross deformities    Gynecological Ultrasonography today:   Uterus: anteverted. Contour is smooth/regular.  Size: 8.2 x 5.1 x 4.1 cm  Endometrium: Thickness Total 5.2 mm  Findings:   Right Ovary: 4.2 x 3.1 x 2.8 cm. Simple cyst vs Dominant follicle 2.6 x 2.3 x 2.4 cm  Left Ovary: 3.1 x 1.6 x 1.8 cm. Wnl  Cul de Sac Free Fluid: No free fluid  Technique: Transvaginal Imaging performed     Impression: Normal pelvic ultrasound      Assessment/Plan:  Dinorah is a 28 year old female presenting today to review US findings and consider Nexplanon removal, if normal, given prolonged unscheduled bleeding.    Reviewed with pt that her TVUS " shows normal findings. Discussed with pt a few options in managing her bleeding: temporarily adding COCs with Nexplanon VS removing Nexplanon to see how her body does without it VS Nexplanon removal and IUD insertion VS IUD insertion today and Nexplanon removal in a week. She is not interested in adding extra hormone with KAMRYN in combination with Nexplanon. She opts for IUD placement today as well as Nexplanon removal today, as she is off of work and not concerned for lapse in contraception as she is not currently sexually active.  Dinorah expresses that she had a lot of post-procedure pain following her last Kyleena IUD insertion - it caused her to pass out at home. She is requesting a short-term prescription for narcotic to help with pain management.     PROCEDURE NOTE: Nexplanon removal  Consent: Affirmation of informed consent was signed and scanned into the medical record. Risks, benefits and alternatives were discussed. Patient's questions were elicited and answered.   Procedure safety checklist was completed:  Yes  Time Out (Pause for the Cause) completed: Yes    Preoperative Diagnosis: etonogestrel implant  Postoperative Diagnosis: etonogestrel implant removed    Technique: On the right arm  Skin prep Betadine  Anesthesia 1% lidocaine  Procedure: Small incision (<5mm) was made at distal end of palpable implant, curved hemostat was used to isolate the implant and bring it to the incision, the fibrous capsule containing the implant was incised and the Implant was removed intact.  EBL: minimal  Complications:  No  Tolerance:  Pt tolerated procedure well and was in stable condition.     Contraception was discussed and patient chose the following method: Kyleena IUD    PROCEDURE NOTE: Kyleena IUD insertion    Verification of Procedure:  Just before the procedure begans through verbal and active participation of team members, I verified:     Initials   Patient Name SLD   Patient  SLD   Procedure to be performed SLD  "    Consent:  Risks, benefits of treatment, and alternative options for contraception were discussed.  Patient's questions were elicited and answered.  Written consent was obtained and scanned into medical record.     OBJECTIVE: /82   Pulse 73   Ht 1.702 m (5' 7\")   Wt 98.7 kg (217 lb 8 oz)   BMI 34.07 kg/m      Pelvic Exam:  EG/BUS: Normal genital architecture without lesions, erythema or abnormal secretions. Bartholin's, Urethra, [place]'s glands are normal.   Vagina: moist, pink, rugae with creamy, white, and odorless secretions  Cervix: no lesions, closed    Pregnancy test: n/a - pt has current contraception and last intercourse was in 7/2023.    Counseling:  Patient counseled on efficacy, benefits, risks, potential side effects of IUD.  Insertion procedure and risk of infection, perforation, and spontaneous expulsion reviewed.   Advised to plan removal and/or replacement of IUD in 5 years from today or when desired.       Dinorah was pre-medicated with: Paracervical block was placed with 1% lidocaine with epi at 12 o'clock, 4 o'clock and 6 o'clock positions     Under sterile technique, cervix was visualized with a medium Lorie speculum and prepped with Betadine solution. The paracervical block was placed using 1% lidocaine with epi. A tenaculum was placed at the 12 o'clock position. The uterus was sounded to 8.25 cm. The  Kyleena  IUD insertion apparatus was prepared and placed through the cervix without significant resistance and deployed at the fundus in the usual fashion. The strings were trimmed 3 cm from the external os.      Device Lot #: [id number]  Device Expiration Date: 2026/JAN     EBL:  Minimal     Complications: None      Dinorah Hart tolerated procedure well.    FOLLOW-UP:  Written information on IUD use reviewed and given. Symptoms to report reviewed. To report heavy bleeding, severe cramping, or abnormal vaginal discharge.  May take Ibuprofen 400-800 mg PO TID PRN or Naproxen 500 mg PO " BID for cramping. Pt was also prescribed 4 tablets of oxycodone that she can take (one q6 hours) PRN for acute pain post-procedure given pain experienced following last IUD insertion. Pt to reach out to provider if continuing to have pain beyond 24 hours. Encouraged use of heat pad as well, and patient given one when leaving today.   Reminded to check for IUD strings every month.   Patient has been counseled to use backup birth control method for 1 week.    Counseled that bruising and scant bleeding is common at site of Nexplanon removal. Reviewed signs/ symptoms of infection to notify provider of. Leave pressure dressing in place x 24 hours, then change bandage daily x 5 days. Steri-strips to remain in place up to 5 days.   Return to clinic in 4-6 weeks for string check. Dinorah Hart  verbalized understanding of instructions.    I, Jemima Woo, completed the PFSH and ROS. I then acted as a scribe for THOR Gupta, for the remainder of the visit.  Jemima Woo, TAYLORN, RN, PEGGY Student    I agree with the PFSH and ROS as completed by the THOR Student, except for changes made by me.  The remainder of the encounter was performed by me and scribed by the THOR Student.  The scribed note accurately reflects my personal services and decisions made by me.  Teresa Hunt DNP, APRN, THOR

## 2024-03-21 NOTE — LETTER
"3/21/2024       RE: Dinorah Hart  [address]  [place] [address]     Dear Colleague,    Thank you for referring your patient, Dinorah Hart, to the [facility] [place] at [facility]. Please see a copy of my visit note below.    Subjective:  Dinorah is a 28 yr old female, presenting today for ultrasound follow up. US was completed this morning due to prolonged menstrual bleeding with Nexplanon in place to rule out structural abnormalities. Other etiologies have been evaluated for (see note from 2/27/24).  Her bleeding started at the end of December 2023 and was a continuous period-like bleeding till the beginning of March 2024; now the bleeding has slowly tapered to spotting.     Past Medical History:   Diagnosis Date    No known health problems      No past surgical history on file.    Family History   Problem Relation Age of Onset    Diabetes Maternal Grandmother     Heart Failure Maternal Grandfather     Mental Illness Paternal Grandfather     Heart Failure Paternal Grandfather         Current Outpatient Medications   Medication    etonogestrel (NEXPLANON) 68 MG IMPL    levonorgestrel (KYLEENA) 19.5 MG IUD    oxyCODONE (ROXICODONE) 5 MG tablet     No current facility-administered medications for this visit.     Allergies   Allergen Reactions    Penapar-Vk [Penicillin V]      Objective:  /82   Pulse 73   Ht 1.702 m (5' 7\")   Wt 98.7 kg (217 lb 8 oz)   BMI 34.07 kg/m     General: pleasant female in no acute distress  Psych: normal mentation, well oriented  Respiratory:  Unlabored breathing  Musculoskeletal: no gross deformities    Gynecological Ultrasonography today:   Uterus: anteverted. Contour is smooth/regular.  Size: 8.2 x 5.1 x 4.1 cm  Endometrium: Thickness Total 5.2 mm  Findings:   Right Ovary: 4.2 x 3.1 x 2.8 cm. Simple cyst vs Dominant follicle 2.6 x 2.3 x 2.4 cm  Left Ovary: 3.1 x 1.6 x 1.8 cm. Wnl  Cul de Sac " Free Fluid: No free fluid  Technique: Transvaginal Imaging performed     Impression: Normal pelvic ultrasound      Assessment/Plan:  Dinorah is a 28 year old female presenting today to review US findings and consider Nexplanon removal, if normal, given prolonged unscheduled bleeding.    Reviewed with pt that her TVUS shows normal findings. Discussed with pt a few options in managing her bleeding: temporarily adding COCs with Nexplanon VS removing Nexplanon to see how her body does without it VS Nexplanon removal and IUD insertion VS IUD insertion today and Nexplanon removal in a week. She is not interested in adding extra hormone with KAMRYN in combination with Nexplanon. She opts for IUD placement today as well as Nexplanon removal today, as she is off of work and not concerned for lapse in contraception as she is not currently sexually active.  Dinorah expresses that she had a lot of post-procedure pain following her last Kyleena IUD insertion - it caused her to pass out at home. She is requesting a short-term prescription for narcotic to help with pain management.     PROCEDURE NOTE: Nexplanon removal  Consent: Affirmation of informed consent was signed and scanned into the medical record. Risks, benefits and alternatives were discussed. Patient's questions were elicited and answered.   Procedure safety checklist was completed:  Yes  Time Out (Pause for the Cause) completed: Yes    Preoperative Diagnosis: etonogestrel implant  Postoperative Diagnosis: etonogestrel implant removed    Technique: On the right arm  Skin prep Betadine  Anesthesia 1% lidocaine  Procedure: Small incision (<5mm) was made at distal end of palpable implant, curved hemostat was used to isolate the implant and bring it to the incision, the fibrous capsule containing the implant was incised and the Implant was removed intact.  EBL: minimal  Complications:  No  Tolerance:  Pt tolerated procedure well and was in stable condition.     Contraception was  "discussed and patient chose the following method: Kyleena IUD    PROCEDURE NOTE: Kyleena IUD insertion    Verification of Procedure:  Just before the procedure begans through verbal and active participation of team members, I verified:     Initials   Patient Name SLD   Patient  SLD   Procedure to be performed SLD     Consent:  Risks, benefits of treatment, and alternative options for contraception were discussed.  Patient's questions were elicited and answered.  Written consent was obtained and scanned into medical record.     OBJECTIVE: /82   Pulse 73   Ht 1.702 m (5' 7\")   Wt 98.7 kg (217 lb 8 oz)   BMI 34.07 kg/m      Pelvic Exam:  EG/BUS: Normal genital architecture without lesions, erythema or abnormal secretions. Bartholin's, Urethra, [place]'s glands are normal.   Vagina: moist, pink, rugae with creamy, white, and odorless secretions  Cervix: no lesions, closed    Pregnancy test: n/a - pt has current contraception and last intercourse was in 2023.    Counseling:  Patient counseled on efficacy, benefits, risks, potential side effects of IUD.  Insertion procedure and risk of infection, perforation, and spontaneous expulsion reviewed.   Advised to plan removal and/or replacement of IUD in 5 years from today or when desired.       Dinorah was pre-medicated with: Paracervical block was placed with 1% lidocaine with epi at 12 o'clock, 4 o'clock and 6 o'clock positions     Under sterile technique, cervix was visualized with a medium Lorie speculum and prepped with Betadine solution. The paracervical block was placed using 1% lidocaine with epi. A tenaculum was placed at the 12 o'clock position. The uterus was sounded to 8.25 cm. The  Kyleena  IUD insertion apparatus was prepared and placed through the cervix without significant resistance and deployed at the fundus in the usual fashion. The strings were trimmed 3 cm from the external os.      Device Lot #: [id number]  Device Expiration Date: "   EBL:  Minimal     Complications: None      Dinorah Hart tolerated procedure well.    FOLLOW-UP:  Written information on IUD use reviewed and given. Symptoms to report reviewed. To report heavy bleeding, severe cramping, or abnormal vaginal discharge.  May take Ibuprofen 400-800 mg PO TID PRN or Naproxen 500 mg PO BID for cramping. Pt was also prescribed 4 tablets of oxycodone that she can take (one q6 hours) PRN for acute pain post-procedure given pain experienced following last IUD insertion. Pt to reach out to provider if continuing to have pain beyond 24 hours. Encouraged use of heat pad as well, and patient given one when leaving today.   Reminded to check for IUD strings every month.   Patient has been counseled to use backup birth control method for 1 week.    Counseled that bruising and scant bleeding is common at site of Nexplanon removal. Reviewed signs/ symptoms of infection to notify provider of. Leave pressure dressing in place x 24 hours, then change bandage daily x 5 days. Steri-strips to remain in place up to 5 days.   Return to clinic in 4-6 weeks for string check. Dinorah Hart  verbalized understanding of instructions.    I, Jemima Woo, completed the PFSH and ROS. I then acted as a scribe for THOR Gupta, for the remainder of the visit.  Jemima Woo, BSN, RN, PEGGY Student    I agree with the PFSH and ROS as completed by the THOR Student, except for changes made by me.  The remainder of the encounter was performed by me and scribed by the THOR Student.  The scribed note accurately reflects my personal services and decisions made by me.  Teersa Hunt DNP, APRN, THOR

## 2024-07-14 ENCOUNTER — HEALTH MAINTENANCE LETTER (OUTPATIENT)
Age: 29
End: 2024-07-14

## 2024-10-29 ENCOUNTER — TRANSFERRED RECORDS (OUTPATIENT)
Dept: HEALTH INFORMATION MANAGEMENT | Facility: CLINIC | Age: 29
End: 2024-10-29
Payer: COMMERCIAL

## 2025-07-19 ENCOUNTER — HEALTH MAINTENANCE LETTER (OUTPATIENT)
Age: 30
End: 2025-07-19